# Patient Record
Sex: FEMALE
[De-identification: names, ages, dates, MRNs, and addresses within clinical notes are randomized per-mention and may not be internally consistent; named-entity substitution may affect disease eponyms.]

---

## 2019-03-17 ENCOUNTER — HOSPITAL ENCOUNTER (EMERGENCY)
Dept: HOSPITAL 31 - C.ER | Age: 64
Discharge: HOME | End: 2019-03-17
Payer: MEDICAID

## 2019-03-17 VITALS — OXYGEN SATURATION: 98 %

## 2019-03-17 VITALS
SYSTOLIC BLOOD PRESSURE: 141 MMHG | HEART RATE: 88 BPM | DIASTOLIC BLOOD PRESSURE: 76 MMHG | TEMPERATURE: 98 F | RESPIRATION RATE: 18 BRPM

## 2019-03-17 DIAGNOSIS — Z63.4: Primary | ICD-10-CM

## 2019-03-17 SDOH — SOCIAL STABILITY - SOCIAL INSECURITY: DISSAPEARANCE AND DEATH OF FAMILY MEMBER: Z63.4

## 2019-03-17 NOTE — C.PDOC
History Of Present Illness


63 year old female presents to the ED for evaluation. Patient's son, who was a 

patient in the hospital's ICU, passed away around 20 minutes ago. Patient was 

found anxious and crying in the ICU, and is sent to the ED for further 

evaluation. Patient denies suicidal/homicidal ideation and does not offer any 

physical complaints at this time. 


Time Seen by Provider: 03/17/19 22:39


Chief Complaint (Nursing): Psychiatric Evaluation


History Per: Patient


History/Exam Limitations: no limitations


Onset/Duration Of Symptoms: Hrs


Current Symptoms Are (Timing): Still Present


Suicide/Self Injury Attempted (Context): None


Modifying Factor(s): None


Severity: None


Associated Symptoms: Anxiety.  denies: Suicidal Thoughts, Suicidal Plan


Involuntary Hold By: None


Recent travel outside of the [place]: No


Additional History Per: Patient





Past Medical History


Reviewed: Historical Data, Nursing Documentation, Vital Signs


Vital Signs: 





                                Last Vital Signs











Temp  97.9 F   03/17/19 22:33


 


Pulse  91 H  03/17/19 22:33


 


Resp  20   03/17/19 22:33


 


BP  154/80 H  03/17/19 22:33


 


Pulse Ox  98   03/17/19 22:33














- Medical History


PMH: HTN, Hyperthyroidism


Surgical History: No Surg Hx


Family History: States: Unknown Family Hx





- Social History


Hx Tobacco Use: No


Hx Alcohol Use: No


Hx Substance Use: No





- Immunization History


Hx Tetanus Toxoid Vaccination: Yes


Hx Influenza Vaccination: No


Hx Pneumococcal Vaccination: Yes





Review Of Systems


Psych: Positive for: Anxiety.  Negative for: Suicidal ideation





Physical Exam





- Physical Exam


Appears: Non-toxic, No Acute Distress, Other (tearful, anxious-appearing )


Skin: Normal Color, Warm, Dry


Head: Atraumatic, Normacephalic


Eye(s): bilateral: Normal Inspection


Oral Mucosa: Moist


Neck: Supple


Chest: Symmetrical, No Deformity


Cardiovascular: Rhythm Regular, No Murmur


Respiratory: Normal Breath Sounds, No Rales, No Rhonchi, No Wheezing


Extremity: Normal ROM


Neurological/Psych: Oriented x3, Normal Speech, Normal Cognition





ED Course And Treatment


O2 Sat by Pulse Oximetry: 98 (on RA)


Pulse Ox Interpretation: Normal





Medical Decision Making


Medical Decision Making: 


tearful s/p death. in er requests "medication to calm down".


Progress: 


Xanax PO given. 





pt asking for dc. no si hi. 





Disposition





- Disposition


Disposition: HOME/ ROUTINE


Disposition Time: 23:03


Condition: STABLE


Additional Instructions: 


return to er with worsening. 


Forms:  [facility] (English)





- Clinical Impression


Clinical Impression: 


 Bereavement due to life event








- Scribe Statement


The provider has reviewed the documentation as recorded by the Scribe (Ameena Torrez)


Provider Attestation: 








All medical record entries made by the Scribe were at my direction and 

personally dictated by me. I have reviewed the chart and agree that the record 

accurately reflects my personal performance of the history, physical exam, 

medical decision making, and the department course for this patient. I have also

personally directed, reviewed, and agree with the discharge instructions and 

disposition.